# Patient Record
Sex: MALE | Race: WHITE | Employment: FULL TIME | URBAN - METROPOLITAN AREA
[De-identification: names, ages, dates, MRNs, and addresses within clinical notes are randomized per-mention and may not be internally consistent; named-entity substitution may affect disease eponyms.]

---

## 2018-09-19 ENCOUNTER — RADIANT APPOINTMENT (OUTPATIENT)
Dept: GENERAL RADIOLOGY | Facility: CLINIC | Age: 48
End: 2018-09-19
Attending: FAMILY MEDICINE
Payer: COMMERCIAL

## 2018-09-19 ENCOUNTER — OFFICE VISIT (OUTPATIENT)
Dept: ORTHOPEDICS | Facility: CLINIC | Age: 48
End: 2018-09-19
Payer: COMMERCIAL

## 2018-09-19 VITALS
DIASTOLIC BLOOD PRESSURE: 88 MMHG | HEART RATE: 66 BPM | BODY MASS INDEX: 28 KG/M2 | SYSTOLIC BLOOD PRESSURE: 148 MMHG | HEIGHT: 75 IN

## 2018-09-19 DIAGNOSIS — M25.572 PAIN AND SWELLING OF LEFT ANKLE: Primary | ICD-10-CM

## 2018-09-19 DIAGNOSIS — M25.579 JOINT PAIN OF ANKLE AND FOOT, UNSPECIFIED LATERALITY: ICD-10-CM

## 2018-09-19 DIAGNOSIS — M67.472 GANGLION CYST OF LEFT FOOT: ICD-10-CM

## 2018-09-19 DIAGNOSIS — M25.472 PAIN AND SWELLING OF LEFT ANKLE: Primary | ICD-10-CM

## 2018-09-19 NOTE — MR AVS SNAPSHOT
After Visit Summary   9/19/2018    Tevin Naqvi    MRN: [id number]           Patient Information     Date Of Birth          1970        Visit Information        Provider Department      9/19/2018 11:00 AM Toan Davey [facility]        Today's Diagnoses     Joint pain of ankle and foot, unspecified laterality    -  1      Care Instructions    PERONEAL TENDONITIS  What is a peroneal tendon injury?   A peroneal tendon injury is a problem with the tendons and muscles on the outer side of your lower leg and foot. Tendons are strong bands of tissue that attach muscle to bone. The peroneal tendons help keep your foot and ankle stable when you walk.   Tendons can be injured suddenly or they may be slowly damaged over time. You can have tiny or partial tears in your tendon. If you have a complete tear of your tendon, it is called a rupture. Other tendon injuries may be called a strain, tendinosis, or tendonitis.  What is the cause?   Peroneal injuries can be caused by:  Overuse of the tendon from a sport or work activity that causes your foot and ankle to roll inward, like when you run on sloped surfaces or run in shoes that are getting worn out on the outside of the heel.   A sudden activity that forces your foot upward toward your shin, like landing on your feet after a fall, or rolling your ankle on a rock while running.   What are the symptoms?   You may hear a pop or a snap when the injury happens. You may have pain and swelling on the outer side of your lower leg or ankle.   How is it diagnosed?   Your healthcare provider will examine you and ask about your symptoms, activities, and medical history. You may have X-rays or other scans.  How is it treated?   While you are recovering from your injury, you will need to change your sport or activity to one that will not make your condition worse. For example, you may need to swim instead of run.   Your  healthcare provider may recommend stretching and strengthening exercises to help you heal.  Use an elastic bandage or an ankle brace as directed by your provider. You may need to use crutches until you can walk without pain.   The pain often gets better within a few weeks with self-care, but some injuries may take several months or longer to heal. It s important to follow all of your healthcare provider s instructions.  How can I take care of myself?   To help relieve swelling and pain:  Put an ice pack, gel pack, or package of frozen vegetables wrapped in a cloth, on the area every 3 to 4 hours for up to 20 minutes at a time.   Do ice massage. To do this, first freeze water in a Styrofoam cup, then peel the top of the cup away to expose the ice. Hold the bottom of the cup and rub the ice over your tendon for 5 to 10 minutes. Do this several times a day while you have pain.   Keep your ankle up on a pillow when you sit or lie down.   Take pain medicine, such as acetaminophen, ibuprofen, or other medicine as directed by your provider. Nonsteroidal anti-inflammatory medicines (NSAIDs), such as ibuprofen, may cause stomach bleeding and other problems. These risks increase with age. Read the label and take as directed. Unless recommended by your healthcare provider, do not take for more than 10 days.  Moist heat may help relax your muscles and make it easier to move your leg. Put moist heat on the injured area for 10 to 15 minutes at a time before you do warm-up and stretching exercises. Moist heat includes heat patches or moist heating pads that you can purchase at most drugstores, a wet washcloth or towel that has been heated in the dryer, or a hot shower. Don t use heat if you have swelling.   Follow your healthcare provider's instructions, including any exercises recommended by your provider. Ask your provider:  How and when you will hear your test results   How long it will take to recover   What activities you  should avoid, including how much you can lift, and when you can return to your normal activities   How to take care of yourself at home   What symptoms or problems you should watch for and what to do if you have them  Make sure you know when you should come back for a checkup.  How can I help prevent a peroneal tendon injury?   Warm-up exercises and stretching before activities can help prevent injuries. For example, do exercises that keep your ankles and leg muscles strong. If your leg or ankle hurts after exercise, putting ice on it may help keep it from getting injured.   Follow safety rules and use any protective equipment recommended for your work or sport. For example, wear high-top athletic shoes or a supportive ankle brace. When you run, choose level surfaces and avoid rocks or holes.  Developed by [employer].  Published by [employer].  Copyright  2014 [employer] and/or one of its subsidiaries. All rights reserved.                Peroneal Tendon Exercises  You may start these exercises when you can stand comfortably on your injured leg with your heel resting on the floor and your full weight evenly distributed on both legs.  Towel stretch: Sit on a hard surface with your injured leg stretched out in front of you. Loop a towel around your toes and the ball of your foot and pull the towel toward your body keeping your leg straight. Hold this position for 15 to 30 seconds and then relax. Repeat 3 times.  When you don't feel much of a stretch using the towel, you can start the following exercises.  Standing calf stretch: Stand facing a wall with your hands on the wall at about eye level. Keep your injured leg back with your heel on the floor. Keep the other leg forward with the knee bent. Turn your back foot slightly inward (as if you were pigeon-toed). Slowly lean into the wall until you feel a stretch in the back of your calf. Hold the stretch for 15 to 30 seconds. Return to the starting position.  Repeat 3 times. Do this exercise several times each day.   Standing soleus stretch: Stand facing a wall with your hands on the wall at about chest height. Keep your injured leg back with your heel on the floor. Keep the other leg forward with the knee bent. Turn your back foot slightly inward (as if you were pigeon-toed). Bend your back knee slightly and gently lean into the wall until you feel a stretch in the lower calf of your injured leg. Hold the stretch for 15 to 30 seconds. Return to the starting position. Repeat 3 times.   Achilles stretch: Stand with the ball of one foot on a stair. Reach for the step below with your heel until you feel a stretch in the arch of your foot. Hold this position for 15 to 30 seconds and then relax. Repeat 3 times.   Heel raise: Stand behind a chair or counter with both feet flat on the floor. Using the chair or counter as a support, rise up onto your toes and hold for 5 seconds. Then slowly lower yourself down without holding onto the support. (It's OK to keep holding onto the support if you need to.) When this exercise becomes less painful, try doing this exercise while you are standing on the injured leg only. Repeat 15 times. Do 2 sets of 15. Rest 30 seconds between sets.   Step-up: Stand with the foot of your injured leg on a support 3 to 5 inches (8 to 13 centimeters) high --like a small step or block of wood. Keep your other foot flat on the floor. Shift your weight onto the injured leg on the support. Straighten your injured leg as the other leg comes off the floor. Return to the starting position by bending your injured leg and slowly lowering your uninjured leg back to the floor. Do 2 sets of 15.   Resisted ankle eversion: Sit with both legs stretched out in front of you, with your feet about a shoulder's width apart. Tie a loop in one end of elastic tubing. Put the foot of your injured leg through the loop so that the tubing goes around the arch of that foot and wraps  around the outside of the other foot. Hold onto the other end of the tubing with your hand to provide tension. Turn the foot of your injured leg up and out. Make sure you keep your other foot still so that it will allow the tubing to stretch as you move the foot of your injured leg. Return to the starting position. Do 2 sets of 15.   Balance and reach exercises: Stand next to a chair with your injured leg farther from the chair. The chair will provide support if you need it. Stand on the foot of your injured leg and bend your knee slightly. Try to raise the arch of this foot while keeping your big toe on the floor. Keep your foot in this position.   With the hand that is farther away from the chair, reach forward in front of you by bending at the waist. Avoid bending your knee any more as you do this. Repeat this 15 times. To make the exercise more challenging, reach farther in front of you. Do 2 sets of 15.   While keeping your arch raised, reach the hand that is farther away from the chair across your body toward the chair. The farther you reach, the more challenging the exercise. Do 2 sets of 15.  If you have access to a wobble board, do the following exercises:  Wobble board exercises   Stand on a wobble board with your feet shoulder-width apart.   Rock the board forwards and backwards 30 times, then side to side 30 times. Hold on to a chair if you need support.   Rotate the wobble board around so that the edge of the board is in contact with the floor at all times. Do this 30 times in a clockwise and then a counterclockwise direction.   Balance on the wobble board for as long as you can without letting the edges touch the floor. Try to do this for 2 minutes without touching the floor.   Rotate the wobble board in clockwise and counterclockwise circles, but do not let the edge of the board touch the floor.   When you have mastered the wobble exercises standing on both legs, try repeating them while standing on  "just your injured leg. After you are able to do these exercises on one leg, try to do them with your eyes closed. Make sure you have something nearby to support you in case you lose your balance.  Developed by [employer].  Published by [employer].  Copyright  2014 [employer] and/or one of its subsidiaries. All rights reserved.                    Follow-ups after your visit        Who to contact     Please call your clinic at [phone] to:    Ask questions about your health    Make or cancel appointments    Discuss your medicines    Learn about your test results    Speak to your doctor            Additional Information About Your Visit        "[employer] Information     [employer] is an electronic gateway that provides easy, online access to your medical records. With [employer], you can request a clinic appointment, read your test results, renew a prescription or communicate with your care team.     To sign up for [employer] visit the website at [email or web address]   You will be asked to enter the access code listed below, as well as some personal information. Please follow the directions to create your username and password.     Your access code is: [email or web address]  Expires: 2018 12:12 PM     Your access code will  in 90 days. If you need help or a new code, please contact your [facility] or call [phone] for assistance.        Care EveryWhere ID     This is your Care EveryWhere ID. This could be used by other organizations to access your [facility] medical records  [id number]        Your Vitals Were     Pulse Height BMI (Body Mass Index)             66 1.905 m (6' 3\") 28 kg/m2          Blood Pressure from Last 3 Encounters:   18 148/88   14 100/70   12 118/74    Weight from Last 3 Encounters:   14 101.6 kg (224 lb)   12 98 kg (216 lb)               Primary Care Provider Office Phone # Fax #    Alexis Guthrie [phone] " [phone]       [address]   [facility] [address]        Equal Access to Services     DEV AGUILAR : Hadbob aad ku hadkhurramandre Juan Ramon, wasusanda joycetoneha, emeliata annettatatianada rebacade, hortensia maddisonin hayaashauna brittonanshul shell laAngelinaaustin hayward. So [facility] [phone].    ATENCIÓN: Si habla español, tiene a renteria disposición servicios gratuitos de asistencia lingüística. Llame al [phone].    We comply with applicable federal civil rights laws and [place] laws. We do not discriminate on the basis of race, color, national origin, age, disability, sex, sexual orientation, or gender identity.            Thank you!     Thank you for choosing [facility]  for your care. Our goal is always to provide you with excellent care. Hearing back from our patients is one way we can continue to improve our services. Please take a few minutes to complete the written survey that you may receive in the mail after your visit with us. Thank you!             Your Updated Medication List - Protect others around you: Learn how to safely use, store and throw away your medicines at www.disposemymeds.org.          This list is accurate as of 9/19/18 12:12 PM.  Always use your most recent med list.                   Brand Name Dispense Instructions for use Diagnosis    NO ACTIVE MEDICATIONS           omeprazole 20 MG tablet     30 tablet    Take 1 tablet (20 mg) by mouth daily Take 30-60 minutes before a meal.    Epigastric pain

## 2018-09-19 NOTE — PROGRESS NOTES
"[facility]  Orthopedics  Toan Davey, DO  2018     Name: Tevin Naqvi  MRN: [id number]  Age: 48 year old  : 1970  Referring provider: Referred Self     Chief Complaint: Left foot pain     Date of Injury: July - 2018    History of Present Illness:   Tevin Naqvi is a 48 year old, male who presents today for evaluation of his left foot pain. Around 1 month ago, the patient noticed a bump forming without incident on his lateral left foot just below the malleolus. The bump was tender for a few days, but soon went away and he thought nothing of it. Yesterday, he began experiencing a shooting pain in his left foot with numbness in the same area. His pain is exacerbated with elevation. He has tried using ice and ibuprofen but they provided little relief. He reports that his pain is slightly relieved if he moves around, and he has been running on it almost everyday. He had trouble sleeping last night and was waking up every hour due to the pain. Mild decrease in sensation of lateral foot.     Review of Systems:   A 10-point review of systems was obtained and is negative except for as noted in the HPI.     Medications:   Current Outpatient Prescriptions:      NO ACTIVE MEDICATIONS, , Disp: , Rfl:      omeprazole 20 MG tablet, Take 1 tablet (20 mg) by mouth daily Take 30-60 minutes before a meal., Disp: 30 tablet, Rfl: 1    Allergies:  The patient reports no known allergies.    Past Medical History:  The patient denies any significant past medical history.    Past Surgical History:  The patient does not have any pertinent past surgical history.    Social History:  The patient is . He denies tobacco use and admits to alcohol use (3 drinks/week).    Family History:  The patient has a family history of hypertensions (MGm) and cancer (MGf).    Physical Examination:  Height 1.905 m (6' 3\").   General  - normal appearance, in no obvious distress  HEENT  -Pupils equal, round, no conjunctival " injection.  No lid lag  CV  - normal pulses at posterior tib and dorsalis pedis  Pulm  - normal respiratory pattern, non-labored  Musculoskeletal - Left foot  - stance: normal gait without limp, normal stance without excessive pronation, normal heel inversion with standing heel raise, no obvious leg length discrepancy, normal heel and toe walk  - inspection: swelling at the lateral foot-ankle junction just inferior to lateral malleolus  - palpation: tenderness at site of swelling at lateral foot-ankle junction overlying peroneal tendons.  - ROM: full ROM, mild pain with resisted ankle eversion  - strength: 5/5 in all planes, pain with resisted eversion.  Neuro  - decreased sensation to light touch on lateral aspect of foot and toes.  Skin  - no ecchymosis, erythema, warmth, or induration, no obvious rash  Psych  - interactive, appropriate, normal mood and affect    Imaging:   XR right ankle (9/19/18)  Mild degenerative changes at the ankle joint     US right ankle (9/19/18)  Using high-frequency linear transducer, lateral foot ankle junction was assessed using ultrasonography.  Surveillance reveals a hypoechoic septated cystic structure adjacent peroneal tendon.  Appears to be communication of peroneal tendon sheath with cyst like mass.  Peroneal tendon appears intact without evidence for tearing.    I have independently reviewed the above imaging studies; the results were discussed with the patient.     Assessment:   48 year old, active runner, male with presenting with intermittent left lateral foot-ankle swelling and pain. US findings consistent with ganglion cyst. I suspect underlying peroneal tendonitis.     Plan:   -Begin using ankle sleeve to add compression  -Continue using ibuprofen and ice  -Start peroneal tendonitis rehab program  -May consider a corticosteroid injection in the future versus MRI.  -Follow-up with me if symptoms continue after next few weeks    I, Toan Davey DO, have reviewed the above  note and agree with the scribe's notation as written.    Scribe Disclosure:   I, Yrn Hutchinson, am serving as a scribe to document services personally performed by Toan Davey DO at this visit, based upon the provider's statements to me. All documentation has been reviewed by the aforementioned provider prior to being entered into the official medical record.

## 2018-09-19 NOTE — PATIENT INSTRUCTIONS
PERONEAL TENDONITIS  What is a peroneal tendon injury?   A peroneal tendon injury is a problem with the tendons and muscles on the outer side of your lower leg and foot. Tendons are strong bands of tissue that attach muscle to bone. The peroneal tendons help keep your foot and ankle stable when you walk.   Tendons can be injured suddenly or they may be slowly damaged over time. You can have tiny or partial tears in your tendon. If you have a complete tear of your tendon, it is called a rupture. Other tendon injuries may be called a strain, tendinosis, or tendonitis.  What is the cause?   Peroneal injuries can be caused by:  Overuse of the tendon from a sport or work activity that causes your foot and ankle to roll inward, like when you run on sloped surfaces or run in shoes that are getting worn out on the outside of the heel.   A sudden activity that forces your foot upward toward your shin, like landing on your feet after a fall, or rolling your ankle on a rock while running.   What are the symptoms?   You may hear a pop or a snap when the injury happens. You may have pain and swelling on the outer side of your lower leg or ankle.   How is it diagnosed?   Your healthcare provider will examine you and ask about your symptoms, activities, and medical history. You may have X-rays or other scans.  How is it treated?   While you are recovering from your injury, you will need to change your sport or activity to one that will not make your condition worse. For example, you may need to swim instead of run.   Your healthcare provider may recommend stretching and strengthening exercises to help you heal.  Use an elastic bandage or an ankle brace as directed by your provider. You may need to use crutches until you can walk without pain.   The pain often gets better within a few weeks with self-care, but some injuries may take several months or longer to heal. It s important to follow all of your healthcare provider s  instructions.  How can I take care of myself?   To help relieve swelling and pain:  Put an ice pack, gel pack, or package of frozen vegetables wrapped in a cloth, on the area every 3 to 4 hours for up to 20 minutes at a time.   Do ice massage. To do this, first freeze water in a Styrofoam cup, then peel the top of the cup away to expose the ice. Hold the bottom of the cup and rub the ice over your tendon for 5 to 10 minutes. Do this several times a day while you have pain.   Keep your ankle up on a pillow when you sit or lie down.   Take pain medicine, such as acetaminophen, ibuprofen, or other medicine as directed by your provider. Nonsteroidal anti-inflammatory medicines (NSAIDs), such as ibuprofen, may cause stomach bleeding and other problems. These risks increase with age. Read the label and take as directed. Unless recommended by your healthcare provider, do not take for more than 10 days.  Moist heat may help relax your muscles and make it easier to move your leg. Put moist heat on the injured area for 10 to 15 minutes at a time before you do warm-up and stretching exercises. Moist heat includes heat patches or moist heating pads that you can purchase at most drugs[employer], a wet washcloth or towel that has been heated in the dryer, or a hot shower. Don t use heat if you have swelling.   Follow your healthcare provider's instructions, including any exercises recommended by your provider. Ask your provider:  How and when you will hear your test results   How long it will take to recover   What activities you should avoid, including how much you can lift, and when you can return to your normal activities   How to take care of yourself at home   What symptoms or problems you should watch for and what to do if you have them  Make sure you know when you should come back for a checkup.  How can I help prevent a peroneal tendon injury?   Warm-up exercises and stretching before activities can help prevent injuries. For  example, do exercises that keep your ankles and leg muscles strong. If your leg or ankle hurts after exercise, putting ice on it may help keep it from getting injured.   Follow safety rules and use any protective equipment recommended for your work or sport. For example, wear high-top athletic shoes or a supportive ankle brace. When you run, choose level surfaces and avoid rocks or holes.  Developed by [employer].  Published by [employer].  Copyright  2014 [employer] and/or one of its subsidiaries. All rights reserved.                Peroneal Tendon Exercises  You may start these exercises when you can stand comfortably on your injured leg with your heel resting on the floor and your full weight evenly distributed on both legs.  Towel stretch: Sit on a hard surface with your injured leg stretched out in front of you. Loop a towel around your toes and the ball of your foot and pull the towel toward your body keeping your leg straight. Hold this position for 15 to 30 seconds and then relax. Repeat 3 times.  When you don't feel much of a stretch using the towel, you can start the following exercises.  Standing calf stretch: Stand facing a wall with your hands on the wall at about eye level. Keep your injured leg back with your heel on the floor. Keep the other leg forward with the knee bent. Turn your back foot slightly inward (as if you were pigeon-toed). Slowly lean into the wall until you feel a stretch in the back of your calf. Hold the stretch for 15 to 30 seconds. Return to the starting position. Repeat 3 times. Do this exercise several times each day.   Standing soleus stretch: Stand facing a wall with your hands on the wall at about chest height. Keep your injured leg back with your heel on the floor. Keep the other leg forward with the knee bent. Turn your back foot slightly inward (as if you were pigeon-toed). Bend your back knee slightly and gently lean into the wall until you feel a stretch in  the lower calf of your injured leg. Hold the stretch for 15 to 30 seconds. Return to the starting position. Repeat 3 times.   Achilles stretch: Stand with the ball of one foot on a stair. Reach for the step below with your heel until you feel a stretch in the arch of your foot. Hold this position for 15 to 30 seconds and then relax. Repeat 3 times.   Heel raise: Stand behind a chair or counter with both feet flat on the floor. Using the chair or counter as a support, rise up onto your toes and hold for 5 seconds. Then slowly lower yourself down without holding onto the support. (It's OK to keep holding onto the support if you need to.) When this exercise becomes less painful, try doing this exercise while you are standing on the injured leg only. Repeat 15 times. Do 2 sets of 15. Rest 30 seconds between sets.   Step-up: Stand with the foot of your injured leg on a support 3 to 5 inches (8 to 13 centimeters) high --like a small step or block of wood. Keep your other foot flat on the floor. Shift your weight onto the injured leg on the support. Straighten your injured leg as the other leg comes off the floor. Return to the starting position by bending your injured leg and slowly lowering your uninjured leg back to the floor. Do 2 sets of 15.   Resisted ankle eversion: Sit with both legs stretched out in front of you, with your feet about a shoulder's width apart. Tie a loop in one end of elastic tubing. Put the foot of your injured leg through the loop so that the tubing goes around the arch of that foot and wraps around the outside of the other foot. Hold onto the other end of the tubing with your hand to provide tension. Turn the foot of your injured leg up and out. Make sure you keep your other foot still so that it will allow the tubing to stretch as you move the foot of your injured leg. Return to the starting position. Do 2 sets of 15.   Balance and reach exercises: Stand next to a chair with your injured leg  farther from the chair. The chair will provide support if you need it. Stand on the foot of your injured leg and bend your knee slightly. Try to raise the arch of this foot while keeping your big toe on the floor. Keep your foot in this position.   With the hand that is farther away from the chair, reach forward in front of you by bending at the waist. Avoid bending your knee any more as you do this. Repeat this 15 times. To make the exercise more challenging, reach farther in front of you. Do 2 sets of 15.   While keeping your arch raised, reach the hand that is farther away from the chair across your body toward the chair. The farther you reach, the more challenging the exercise. Do 2 sets of 15.  If you have access to a wobble board, do the following exercises:  Wobble board exercises   Stand on a wobble board with your feet shoulder-width apart.   Rock the board forwards and backwards 30 times, then side to side 30 times. Hold on to a chair if you need support.   Rotate the wobble board around so that the edge of the board is in contact with the floor at all times. Do this 30 times in a clockwise and then a counterclockwise direction.   Balance on the wobble board for as long as you can without letting the edges touch the floor. Try to do this for 2 minutes without touching the floor.   Rotate the wobble board in clockwise and counterclockwise circles, but do not let the edge of the board touch the floor.   When you have mastered the wobble exercises standing on both legs, try repeating them while standing on just your injured leg. After you are able to do these exercises on one leg, try to do them with your eyes closed. Make sure you have something nearby to support you in case you lose your balance.  Developed by [employer].  Published by [employer].  Copyright  2014 [employer] and/or one of its subsidiaries. All rights reserved.

## 2018-09-19 NOTE — LETTER
9/19/2018       RE: Tevin Naqvi  [address] [place] [address]     Dear Colleague,    Thank you for referring your patient, Tevin Naqvi, to the  [facility] at [facility]. Please see a copy of my visit note below.          SPORTS & ORTHOPEDIC WALK-IN VISIT 9/19/2018    Primary Care Physician: Dr. Guthrie    Had an issue about a month or two ago where he got a bump on lateral foot just below malleolus. Was tender for a few days and then went away. Last night he starting getting some random pain and numbness in the same area. Shooting pain. Bump is still present, doesn't move around. Difficulty sleeping last night.     Reason for visit:     What part of your body is injured / painful?  left foot    What caused the injury /pain? Unsure    How long ago did your injury occur or pain begin? yesterday    What are your most bothersome symptoms? Pain and Numbness    How would you characterize your symptom?  shooting    What makes your symptoms better? Ice and Ibuprofen didn't really help, better with standing and walking    What makes your symptoms worse? Other: elevation    Have you been previously seen for this problem? No    Medical History:    Any recent changes to your medical history? No    Any new medication prescribed since last visit? No    Have you had surgery on this body part before? No    Social History:    Occupation:  for [employer]    Handedness: Right    Exercise: Most days/week    Review of Systems:    Do you have fever, chills, weight loss? No    Do you have any vision problems? No    Do you have any chest pain or edema? No    Do you have any shortness of breath or wheezing?  No    Do you have stomach problems? No    Do you have any numbness or focal weakness? Yes, in left foot    Do you have diabetes? No    Do you have problems with bleeding or clotting? No    Do you have an rashes or other skin lesions?  "No           [facility]  Orthopedics  Toan Davey, DO  2018     Name: Tevin Naqvi  MRN: [id number]  Age: 48 year old  : 1970  Referring provider: Referred Self     Chief Complaint: Left foot pain     Date of Injury: July - 2018    History of Present Illness:   Tevin Naqvi is a 48 year old, male who presents today for evaluation of his left foot pain. Around 1 month ago, the patient noticed a bump forming without incident on his lateral left foot just below the malleolus. The bump was tender for a few days, but soon went away and he thought nothing of it. Yesterday, he began experiencing a shooting pain in his left foot with numbness in the same area. His pain is exacerbated with elevation. He has tried using ice and ibuprofen but they provided little relief. He reports that his pain is slightly relieved if he moves around, and he has been running on it almost everyday. He had trouble sleeping last night and was waking up every hour due to the pain. Mild decrease in sensation of lateral foot.     Review of Systems:   A 10-point review of systems was obtained and is negative except for as noted in the HPI.     Medications:   Current Outpatient Prescriptions:      NO ACTIVE MEDICATIONS, , Disp: , Rfl:      omeprazole 20 MG tablet, Take 1 tablet (20 mg) by mouth daily Take 30-60 minutes before a meal., Disp: 30 tablet, Rfl: 1    Allergies:  The patient reports no known allergies.    Past Medical History:  The patient denies any significant past medical history.    Past Surgical History:  The patient does not have any pertinent past surgical history.    Social History:  The patient is . He denies tobacco use and admits to alcohol use (3 drinks/week).    Family History:  The patient has a family history of hypertensions (MGm) and cancer (MGf).    Physical Examination:  Height 1.905 m (6' 3\").   General  - normal appearance, in no obvious distress  HEENT  -Pupils equal, round, no " conjunctival injection.  No lid lag  CV  - normal pulses at posterior tib and dorsalis pedis  Pulm  - normal respiratory pattern, non-labored  Musculoskeletal - Left foot  - stance: normal gait without limp, normal stance without excessive pronation, normal heel inversion with standing heel raise, no obvious leg length discrepancy, normal heel and toe walk  - inspection: swelling at the lateral foot-ankle junction just inferior to lateral malleolus  - palpation: tenderness at site of swelling at lateral foot-ankle junction overlying peroneal tendons.  - ROM: full ROM, mild pain with resisted ankle eversion  - strength: 5/5 in all planes, pain with resisted eversion.  Neuro  - decreased sensation to light touch on lateral aspect of foot and toes.  Skin  - no ecchymosis, erythema, warmth, or induration, no obvious rash  Psych  - interactive, appropriate, normal mood and affect    Imaging:   XR right ankle (9/19/18)  Mild degenerative changes at the ankle joint     US right ankle (9/19/18)  Using high-frequency linear transducer, lateral foot ankle junction was assessed using ultrasonography.  Surveillance reveals a hypoechoic septated cystic structure adjacent peroneal tendon.  Appears to be communication of peroneal tendon sheath with cyst like mass.  Peroneal tendon appears intact without evidence for tearing.    I have independently reviewed the above imaging studies; the results were discussed with the patient.     Assessment:   48 year old, active runner, male with presenting with intermittent left lateral foot-ankle swelling and pain. US findings consistent with ganglion cyst. I suspect underlying peroneal tendonitis.     Plan:   -Begin using ankle sleeve to add compression  -Continue using ibuprofen and ice  -Start peroneal tendonitis rehab program  -May consider a corticosteroid injection in the future versus MRI.  -Follow-up with me if symptoms continue after next few weeks    I, Toan Davey, , have  reviewed the above note and agree with the scribe's notation as written.    Scribe Disclosure:   I, Yrn Hutchinson, am serving as a scribe to document services personally performed by Toan Davey DO at this visit, based upon the provider's statements to me. All documentation has been reviewed by the aforementioned provider prior to being entered into the official medical record.

## 2018-09-19 NOTE — PROGRESS NOTES
SPORTS & ORTHOPEDIC WALK-IN VISIT 9/19/2018    Primary Care Physician: Dr. Guthrie    Had an issue about a month or two ago where he got a bump on lateral foot just below malleolus. Was tender for a few days and then went away. Last night he starting getting some random pain and numbness in the same area. Shooting pain. Bump is still present, doesn't move around. Difficulty sleeping last night.     Reason for visit:     What part of your body is injured / painful?  left foot    What caused the injury /pain? Unsure    How long ago did your injury occur or pain begin? yesterday    What are your most bothersome symptoms? Pain and Numbness    How would you characterize your symptom?  shooting    What makes your symptoms better? Ice and Ibuprofen didn't really help, better with standing and walking    What makes your symptoms worse? Other: elevation    Have you been previously seen for this problem? No    Medical History:    Any recent changes to your medical history? No    Any new medication prescribed since last visit? No    Have you had surgery on this body part before? No    Social History:    Occupation:  for [employer]    Handedness: Right    Exercise: Most days/week    Review of Systems:    Do you have fever, chills, weight loss? No    Do you have any vision problems? No    Do you have any chest pain or edema? No    Do you have any shortness of breath or wheezing?  No    Do you have stomach problems? No    Do you have any numbness or focal weakness? Yes, in left foot    Do you have diabetes? No    Do you have problems with bleeding or clotting? No    Do you have an rashes or other skin lesions? No

## 2020-07-27 ENCOUNTER — OFFICE VISIT (OUTPATIENT)
Dept: URGENT CARE | Facility: URGENT CARE | Age: 50
End: 2020-07-27
Payer: COMMERCIAL

## 2020-07-27 VITALS
SYSTOLIC BLOOD PRESSURE: 134 MMHG | HEIGHT: 76 IN | DIASTOLIC BLOOD PRESSURE: 87 MMHG | BODY MASS INDEX: 26.18 KG/M2 | OXYGEN SATURATION: 99 % | WEIGHT: 215 LBS | TEMPERATURE: 98.5 F | HEART RATE: 80 BPM

## 2020-07-27 DIAGNOSIS — L01.00 IMPETIGO: Primary | ICD-10-CM

## 2020-07-27 PROCEDURE — 99203 OFFICE O/P NEW LOW 30 MIN: CPT | Performed by: FAMILY MEDICINE

## 2020-07-27 RX ORDER — CEPHALEXIN 500 MG/1
500 CAPSULE ORAL 3 TIMES DAILY
Qty: 21 CAPSULE | Refills: 0 | Status: SHIPPED | OUTPATIENT
Start: 2020-07-27 | End: 2020-08-03

## 2020-07-27 RX ORDER — MUPIROCIN 20 MG/G
OINTMENT TOPICAL 2 TIMES DAILY
Qty: 22 G | Refills: 0 | Status: SHIPPED | OUTPATIENT
Start: 2020-07-27 | End: 2020-08-03

## 2020-07-27 ASSESSMENT — MIFFLIN-ST. JEOR: SCORE: 1951.73

## 2020-07-27 NOTE — PROGRESS NOTES
"SUBJECTIVE:  Chief Complaint   Patient presents with     Urgent Care     Pt in clinic to have eval for facial lesion.     Derm Problem       Tevin Naqvi is a 49 year old male who presents to the clinic today for a rash.  Onset of rash was 5 day(s) ago.   Rash is gradual onset, still present, worsening and constant.    Location of the rash:   Right cheek  Quality/symptoms of rash: burning, painful, discharge and red   Symptoms are moderate and rash seems to be worsening.  Previous history of a similar rash? No  Recent exposure history: none known-  Possible insect bite    Associated symptoms include: nothing.  Patient denies: throat tightness, wheezing, cough, tongue/lip swelling and shortness of breath.    No past medical history on file.  Patient Active Problem List   Diagnosis     CARDIOVASCULAR SCREENING; LDL GOAL LESS THAN 130       ALLERGIES:  Patient has no known allergies.    NO ACTIVE MEDICATIONS,   omeprazole 20 MG tablet, Take 1 tablet (20 mg) by mouth daily Take 30-60 minutes before a meal. (Patient not taking: Reported on 9/19/2018)    No current facility-administered medications on file prior to visit.       Social History     Tobacco Use     Smoking status: Never Smoker     Smokeless tobacco: Never Used   Substance Use Topics     Alcohol use: Yes     Comment: 3 per week       Family History   Problem Relation Age of Onset     Hypertension Maternal Grandmother      Cancer Maternal Grandfather      Unknown/Adopted Paternal Grandmother      Unknown/Adopted Paternal Grandfather          ROS:  EYES: NEGATIVE for vision changes or irritation  RESP:NEGATIVE for significant cough or SOB  GI: NEGATIVE for nausea, abdominal pain, heartburn, or change in bowel habits    EXAM:   /87   Pulse 80   Temp 98.5  F (36.9  C) (Oral)   Ht 1.93 m (6' 4\")   Wt 97.5 kg (215 lb)   SpO2 99%   BMI 26.17 kg/m    GENERAL: alert, no acute distress.  SKIN: Rash description:    Distribution: localized  Location: right " cheek  Size  1 cm. X 1 cm.  Color: honey crust and red,  Lesion type: pustular,   discrete lesion  with inflammation and honey colored crusting           EYES:   EOMI,   conjunctiva clear  HENT:   External ears with no swelling or lesions   Nose and lips without  Swelling, ulcers, erythema or lesions  NECK:   normal pain free ROM  RESP:   no labored respirations, no tachypnea  EXTREMITIES:   Full ROM without expression of pain or limitation x 4 extremities  NEURO:   Normal strength and tone, ambulation without difficulty,   normal speech and mentation  PSYCH:   mentation and affect appears normal and patient appearance--appropriately groomed         ASSESSMENT:  Impetigo     - cephALEXin (KEFLEX) 500 MG capsule; Take 1 capsule (500 mg) by mouth 3 times daily for 7 days  - mupirocin (BACTROBAN) 2 % external ointment; Apply topically 2 times daily for 7 days     Discussed that  Impetigo are staph and strep infections that develop on the surface of the skin when there is a break in the skin.    Impetigo spreads easily among people from direct contact or from contamination of objects, furniture, or  shared public sites.   Hands and razors are common mechanisms of spreading the infection.  The infections easily spread among young children.      Apply topical mupiricin 2-3 x per day to the local infection until resolved.  The infection should be covered with a bandage and/ or clothing to prevent spread to others.          Follow-up with primary clinic if not improving with spreading infection

## 2020-07-27 NOTE — PATIENT INSTRUCTIONS
Patient Education     Understanding Impetigo  Impetigo is a common bacterial infection of the skin. It most often affects the face, arms, and legs. But it can appear on any part of the body. Anyone can have it, regardless of age. But it is most common in children. Impetigo is very contagious. This means it spreads easily to other people.  How to say it  [id number]   What causes impetigo?  Many types of bacteria live on normal, healthy skin. The bacteria usually don t cause problems. Impetigo happens when bacteria enter the skin through a scratch, break, sore, bite, or irritated spot. They then begin to grow out of control, leading to infection. There are two types of staphylococcus bacteria that cause impetigo. In certain cases, impetigo appears on skin that has no visible break. It may be more likely to occur on skin that has another skin problem, such as eczema. It may also be more common after a cold or other virus.  Symptoms of impetigo  Symptoms of this problem include:    Small, fluid-filled blisters on the skin that may itch, ooze, or crust    A yellow, honey-colored crust on the infected skin    Skin sores that spread with scratching    An itchy rash that spreads with scratching    Swollen lymph nodes  Treatment for impetigo  The goal is to treat the infection and prevent it from spreading to others.    You will likely be given an antibiotic to treat the infection. This may be a cream or ointment called muporicin to put on your skin. If the infection is severe or spreading, you may be given antibiotic medicine to take by mouth. Be sure to use this medicine as directed. Do not stop using it until you are told to stop, even if your skin gets better. If you stop too soon, the infection may come back and be harder to treat.    Avoid scratching or picking at your sores. It may help to cover affected areas with a bandage.    To prevent spreading the infection, wash your hands often. Avoid sharing personal  items, towels, clothes, pillows, and sheets with others. After each use, wash these items in hot water.    Clean the affected skin several times a day. Don t scrub. Instead, soak the area in warm, soapy water. This will help remove the crust that forms. For places that you can't soak, such as the face, place a clean, warm (not hot) washcloth on the affected area. Use a new washcloth and towel each time.  When to call your healthcare provider  Call your healthcare provider right away if you have any of these:    Fever of 100.4 F (38 C) or higher, or as directed    Increasing number of sores or spreading areas of redness after 2 days of treatment with antibiotics    Increasing swelling or pain    Increased amounts of fluid or pus coming from the sores    Unusual drowsiness, weakness, or change in behavior    Loss of appetite or vomiting   Date Last Reviewed: 5/1/2016 2000-2019 [employer]. [address] [place] [address], [place] [id number]. All rights reserved. This information is not intended as a substitute for professional medical care. Always follow your healthcare professional's instructions.

## 2021-04-01 ENCOUNTER — IMMUNIZATION (OUTPATIENT)
Dept: NURSING | Facility: CLINIC | Age: 51
End: 2021-04-01
Payer: COMMERCIAL

## 2021-04-01 PROCEDURE — 91300 PR COVID VAC PFIZER DIL RECON 30 MCG/0.3 ML IM: CPT

## 2021-04-01 PROCEDURE — 0001A PR COVID VAC PFIZER DIL RECON 30 MCG/0.3 ML IM: CPT

## 2021-04-04 ENCOUNTER — HEALTH MAINTENANCE LETTER (OUTPATIENT)
Age: 51
End: 2021-04-04

## 2021-04-22 ENCOUNTER — IMMUNIZATION (OUTPATIENT)
Dept: NURSING | Facility: CLINIC | Age: 51
End: 2021-04-22
Attending: INTERNAL MEDICINE
Payer: COMMERCIAL

## 2021-04-22 PROCEDURE — 0002A PR COVID VAC PFIZER DIL RECON 30 MCG/0.3 ML IM: CPT

## 2021-04-22 PROCEDURE — 91300 PR COVID VAC PFIZER DIL RECON 30 MCG/0.3 ML IM: CPT

## 2021-09-18 ENCOUNTER — HEALTH MAINTENANCE LETTER (OUTPATIENT)
Age: 51
End: 2021-09-18

## 2022-04-30 ENCOUNTER — HEALTH MAINTENANCE LETTER (OUTPATIENT)
Age: 52
End: 2022-04-30

## 2022-11-20 ENCOUNTER — HEALTH MAINTENANCE LETTER (OUTPATIENT)
Age: 52
End: 2022-11-20

## 2023-06-01 ENCOUNTER — HEALTH MAINTENANCE LETTER (OUTPATIENT)
Age: 53
End: 2023-06-01